# Patient Record
Sex: FEMALE | Race: BLACK OR AFRICAN AMERICAN | NOT HISPANIC OR LATINO
[De-identification: names, ages, dates, MRNs, and addresses within clinical notes are randomized per-mention and may not be internally consistent; named-entity substitution may affect disease eponyms.]

---

## 2018-06-04 PROBLEM — Z00.00 ENCOUNTER FOR PREVENTIVE HEALTH EXAMINATION: Status: ACTIVE | Noted: 2018-06-04

## 2018-11-02 ENCOUNTER — APPOINTMENT (OUTPATIENT)
Dept: GASTROENTEROLOGY | Facility: CLINIC | Age: 63
End: 2018-11-02
Payer: COMMERCIAL

## 2018-11-02 VITALS
DIASTOLIC BLOOD PRESSURE: 90 MMHG | RESPIRATION RATE: 16 BRPM | HEART RATE: 75 BPM | BODY MASS INDEX: 34.27 KG/M2 | HEIGHT: 59 IN | OXYGEN SATURATION: 98 % | SYSTOLIC BLOOD PRESSURE: 140 MMHG | WEIGHT: 170 LBS | TEMPERATURE: 98.1 F

## 2018-11-02 DIAGNOSIS — Z78.9 OTHER SPECIFIED HEALTH STATUS: ICD-10-CM

## 2018-11-02 DIAGNOSIS — R10.13 EPIGASTRIC PAIN: ICD-10-CM

## 2018-11-02 DIAGNOSIS — E66.9 OBESITY, UNSPECIFIED: ICD-10-CM

## 2018-11-02 PROCEDURE — 99244 OFF/OP CNSLTJ NEW/EST MOD 40: CPT

## 2018-11-02 RX ORDER — MAG HYDROX/ALUMINUM HYD/SIMETH 200-200-20
200-200-20 SUSPENSION, ORAL (FINAL DOSE FORM) ORAL
Refills: 0 | Status: ACTIVE | COMMUNITY

## 2018-11-02 RX ORDER — FAMOTIDINE 20 MG/1
20 TABLET, FILM COATED ORAL
Refills: 0 | Status: DISCONTINUED | COMMUNITY

## 2018-11-07 ENCOUNTER — OUTPATIENT (OUTPATIENT)
Dept: OUTPATIENT SERVICES | Facility: HOSPITAL | Age: 63
LOS: 1 days | End: 2018-11-07
Payer: COMMERCIAL

## 2018-11-07 ENCOUNTER — APPOINTMENT (OUTPATIENT)
Dept: ULTRASOUND IMAGING | Facility: CLINIC | Age: 63
End: 2018-11-07
Payer: COMMERCIAL

## 2018-11-07 DIAGNOSIS — Z00.8 ENCOUNTER FOR OTHER GENERAL EXAMINATION: ICD-10-CM

## 2018-11-07 PROCEDURE — 76700 US EXAM ABDOM COMPLETE: CPT | Mod: 26

## 2018-11-07 PROCEDURE — 76700 US EXAM ABDOM COMPLETE: CPT

## 2019-03-09 ENCOUNTER — OTHER (OUTPATIENT)
Age: 64
End: 2019-03-09

## 2019-03-09 RX ORDER — POLYETHYLENE GLYCOL 3350, SODIUM SULFATE, SODIUM CHLORIDE, POTASSIUM CHLORIDE, ASCORBIC ACID, SODIUM ASCORBATE 7.5-2.691G
100 KIT ORAL
Qty: 1 | Refills: 0 | Status: ACTIVE | COMMUNITY
Start: 2019-03-09 | End: 1900-01-01

## 2019-06-10 ENCOUNTER — APPOINTMENT (OUTPATIENT)
Dept: GASTROENTEROLOGY | Facility: HOSPITAL | Age: 64
End: 2019-06-10

## 2019-06-10 ENCOUNTER — RESULT REVIEW (OUTPATIENT)
Age: 64
End: 2019-06-10

## 2019-06-10 ENCOUNTER — OUTPATIENT (OUTPATIENT)
Dept: OUTPATIENT SERVICES | Facility: HOSPITAL | Age: 64
LOS: 1 days | Discharge: HOME | End: 2019-06-10
Payer: COMMERCIAL

## 2019-06-10 DIAGNOSIS — Z12.11 ENCOUNTER FOR SCREENING FOR MALIGNANT NEOPLASM OF COLON: ICD-10-CM

## 2019-06-10 PROCEDURE — 88305 TISSUE EXAM BY PATHOLOGIST: CPT | Mod: 26

## 2019-06-10 PROCEDURE — 45380 COLONOSCOPY AND BIOPSY: CPT

## 2019-06-11 LAB — SURGICAL PATHOLOGY STUDY: SIGNIFICANT CHANGE UP

## 2021-06-16 ENCOUNTER — APPOINTMENT (OUTPATIENT)
Dept: GASTROENTEROLOGY | Facility: CLINIC | Age: 66
End: 2021-06-16
Payer: MEDICARE

## 2021-06-16 VITALS
BODY MASS INDEX: 34.68 KG/M2 | SYSTOLIC BLOOD PRESSURE: 160 MMHG | HEIGHT: 59 IN | TEMPERATURE: 98.7 F | DIASTOLIC BLOOD PRESSURE: 80 MMHG | WEIGHT: 172 LBS

## 2021-06-16 DIAGNOSIS — R10.32 LEFT LOWER QUADRANT PAIN: ICD-10-CM

## 2021-06-16 DIAGNOSIS — K57.30 DIVERTICULOSIS OF LARGE INTESTINE W/OUT PERFORATION OR ABSCESS W/OUT BLEEDING: ICD-10-CM

## 2021-06-16 DIAGNOSIS — Z86.010 PERSONAL HISTORY OF COLONIC POLYPS: ICD-10-CM

## 2021-06-16 PROCEDURE — 99214 OFFICE O/P EST MOD 30 MIN: CPT

## 2021-06-16 NOTE — HISTORY OF PRESENT ILLNESS
[FreeTextEntry1] : Office revisit on 6/16/2021.\par \par Patient presents for follow-up regarding left lower quadrant abdominal pain.\par \par The patient was previously evaluated for office consultation on 11/2/18.\par \par INITIAL CONSULTATION NOTE:\par \par The patient is a 62-year-old woman evaluated for office consultation regarding abdominal pain. PMD: Dr. Vera Medeiros.\par \par Patient reports 2 recent episodes of severe upper abdominal pain. Was in usual state of health until approximately 10/22/18 when she experienced onset of a severe sharp upper abdominal pain radiating to the back with associated sweats, chills, nausea and nonbloody emesis. This severe episode of pain lasted 4 hours. Previously was feeling well except for the 2 days prior to onset she was experiencing back pain and migraine and was taking Excedrin.\par \par Patient was evaluated in the ER for this abdominal pain and was treated and released. Review of 10/23/18 labs:\par -CBC: WBC 8400, hemoglobin 12.7, hematocrit 37.3, platelet count 293,000.\par -CMP: Normal. Creatinine 0.64. Normal liver enzymes. ALT 20.\par -Lipase 73.\par Patient reports that an abdominal ultrasound was normal (per patient-report not available for confirmation).\par \par Famotidine was prescribed but the patient indictated poor tolerance and she discontinued it.\par \par Patient then experienced a recurrent episode of this same abdominal pain on 10/25/18 prompting a return trip to the emergency room. Similar pain recurred with complaint of an severe sharp upper abdominal pain radiating to the back with nausea.\par \par The patient continues to not feel well. However, not experiencing any further episodes of this severe sharp upper abdominal pain.\par \par Currently complaining of an intermittent complaint of upper abdominal fullness and discomfort. Feels as if she has eaten a large meal. Of note, this complaint of upper abdominal fullness is independent of food intake. Experiences onset of this in the morning and despite this full feeling she is able to eat her usual meal without having to stop because of early satiety. Experiencing some nausea but no vomiting. This upper abdominal fullness, pressure discomfort and feeling of heaviness is intermittent with episodes occurring approximately 4 times daily and lasting approximately 30 minutes. Precipitating factors not reported. As noted, no clear relationship to particular food or food intake. Does report some relief after Mylanta.\par \par Patient is also experiencing and intermittent vague retrosternal pressure discomfort.\par \par Denies fever. Appetite is fair. Reports no weight decrease. Denies any complaints of dysphagia, heartburn, regurgitation or vomiting. Experiencing intermittent nausea. Patient has 2 formed bowel movements daily without any current complains of diarrhea, constipation, change in bowel habit or rectal bleeding.\par \par Patient reports being under increased stress over this past year related to her daughter's illness.\par \par The patient indicates that one month ago she underwent an exercise stress test and nuclear cardiac testing and she reports this was normal.\par \par The patient reports a remote history of "gastritis".\par \par The patient reports that she had a normal colonoscopy 8 years ago (per patient-prior records not available for review).\par \par The patient reports no other history of digestive illness except as noted above. Family history of colon cancer is not reported.\par \par CURRENT HISTORY:\par \par COLONOSCOPY 6/10/2019:      -Screening colonoscopy was performed on 6/10/19. Total colonoscopy was performed to the cecum with ileoscopy. Normal terminal ileum. A 2 mm cecal nonneoplastic polyp and a 2 mm mid ascending colon adenoma were removed. Diverticula were noted in the ascending colon. Hemorrhoids were detected. The colonoscopy examination was otherwise normal. A followup surveillance colonoscopy is advised in 5 years. \par \par ORV 6/16/2021:     -Experiencing an intermittent left lower quadrant abdominal pain of 2 months duration.  Precipitating factors at onset not reported.  Occurred when driving in a car initially.  Reports experiencing an intermittent left lower quadrant abdominal pain described as a "sharp pain like a jab".  This is a momentary nonradiating pain lasting a few moments.  Occurs 2-3 times per week.  Precipitating factors not reported.  No clear relation to eating or particular food except possibly cashews.  No relationship to exertion, physical activity etc.  Experiences no associated symptoms such as nausea, vomiting, bowel disturbance etc.  More recently frequency of pain somewhat decreased.  Indicates being evaluated by gynecology including pelvic exam and pelvic ultrasound without significant findings.\par                                 -Otherwise feels well.  Appetite and weight are stable.  Denies any complaints of dysphagia, nausea, vomiting, bloating, abdominal distention or abdominal pain at other sites.  Has 2-3 formed bowel movements daily without any complaints of diarrhea, constipation, change in bowel habit or rectal bleeding.  Indicates no  or GYN symptoms such as urinary frequency, dysuria or vaginal discharge.

## 2021-06-16 NOTE — PHYSICAL EXAM
[General Appearance - In No Acute Distress] : in no acute distress [General Appearance - Alert] : alert [General Appearance - Well Nourished] : well nourished [General Appearance - Well Developed] : well developed [General Appearance - Well-Appearing] : healthy appearing [Oropharynx] : the oropharynx was normal [Sclera] : the sclera and conjunctiva were normal [Neck Appearance] : the appearance of the neck was normal [Respiration, Rhythm And Depth] : normal respiratory rhythm and effort [Exaggerated Use Of Accessory Muscles For Inspiration] : no accessory muscle use [Heart Rate And Rhythm] : heart rate was normal and rhythm regular [Auscultation Breath Sounds / Voice Sounds] : lungs were clear to auscultation bilaterally [Heart Sounds] : normal S1 and S2 [Heart Sounds Gallop] : no gallops [Heart Sounds Pericardial Friction Rub] : no pericardial rub [Murmurs] : no murmurs [Edema] : there was no peripheral edema [Bowel Sounds] : normal bowel sounds [Abdomen Soft] : soft [Abdomen Tenderness] : non-tender [Abdomen Mass (___ Cm)] : no abdominal mass palpated [] : no hepato-splenomegaly [Abdomen Hernia] : no hernia was discovered [Normal Sphincter Tone] : normal sphincter tone [No Rectal Mass] : no rectal mass [Cervical Lymph Nodes Enlarged Posterior Bilaterally] : posterior cervical [Cervical Lymph Nodes Enlarged Anterior Bilaterally] : anterior cervical [Supraclavicular Lymph Nodes Enlarged Bilaterally] : supraclavicular [No CVA Tenderness] : no ~M costovertebral angle tenderness [Abnormal Walk] : normal gait [Nail Clubbing] : no clubbing  or cyanosis of the fingernails [Skin Color & Pigmentation] : normal skin color and pigmentation [Oriented To Time, Place, And Person] : oriented to person, place, and time [Impaired Insight] : insight and judgment were intact [Affect] : the affect was normal [Mood] : the mood was normal [FreeTextEntry1] : No perianal tenderness.  No mass.  Good sphincter tone.  Blood not detected.

## 2021-06-16 NOTE — REASON FOR VISIT
[Follow-Up: _____] : a [unfilled] follow-up visit [FreeTextEntry1] : for evaluation of left lower quadrant abdominal pain.

## 2021-06-16 NOTE — CONSULT LETTER
[Dear  ___] : Dear  [unfilled], [Consult Letter:] : I had the pleasure of evaluating your patient, [unfilled]. [( Thank you for referring [unfilled] for consultation for _____ )] : Thank you for referring [unfilled] for consultation for [unfilled] [Please see my note below.] : Please see my note below. [Consult Closing:] : Thank you very much for allowing me to participate in the care of this patient.  If you have any questions, please do not hesitate to contact me. [Sincerely,] : Sincerely, [FreeTextEntry2] : Dr. Vera Medeiros [FreeTextEntry3] : Juan Carlos Perry M.D.

## 2021-06-16 NOTE — ASSESSMENT
[FreeTextEntry1] : Impression:\par \par #1 left lower quadrant abdominal pain–experiencing an intermittent nonradiating momentary sharp left lower quadrant abdominal pain without associated symptoms.  Reports negative gynecological evaluation.  Indicates symptoms somewhat recently improved.  Etiology unclear–possible abdominal wall etiology.  No indication of acute intra-abdominal pathologic process such as colitis, bowel obstruction and symptoms not suggestive of diverticulitis.\par \par #2  History colonic adenomatous polyp–colonoscopy in 6/10/2019 noted for removal of a diminutive 2 mm mid ascending colon adenoma.\par \par #3 ascending colon diverticulosis.\par \par #4 obesity-BMI 34.74.

## 2023-07-10 ENCOUNTER — APPOINTMENT (OUTPATIENT)
Dept: GASTROENTEROLOGY | Facility: CLINIC | Age: 68
End: 2023-07-10
Payer: MEDICARE

## 2023-07-10 VITALS
SYSTOLIC BLOOD PRESSURE: 128 MMHG | TEMPERATURE: 97.3 F | DIASTOLIC BLOOD PRESSURE: 75 MMHG | HEART RATE: 112 BPM | BODY MASS INDEX: 35.35 KG/M2 | OXYGEN SATURATION: 98 % | WEIGHT: 175 LBS

## 2023-07-10 DIAGNOSIS — Z12.11 ENCOUNTER FOR SCREENING FOR MALIGNANT NEOPLASM OF COLON: ICD-10-CM

## 2023-07-10 PROCEDURE — 36415 COLL VENOUS BLD VENIPUNCTURE: CPT

## 2023-07-10 PROCEDURE — 99214 OFFICE O/P EST MOD 30 MIN: CPT | Mod: 25

## 2023-07-10 NOTE — CONSULT LETTER
[Dear  ___] : Dear  [unfilled], [Consult Letter:] : I had the pleasure of evaluating your patient, [unfilled]. [Please see my note below.] : Please see my note below. [Consult Closing:] : Thank you very much for allowing me to participate in the care of this patient.  If you have any questions, please do not hesitate to contact me. [Sincerely,] : Sincerely, [FreeTextEntry2] : Dr. Vera Medeiros [FreeTextEntry3] : Juan Carlos Perry M.D.

## 2023-07-10 NOTE — PHYSICAL EXAM
[Alert] : alert [Normal Voice/Communication] : normal voice/communication [No Acute Distress] : no acute distress [Obese (BMI >= 30)] : obese (BMI >= 30) [Sclera] : the sclera and conjunctiva were normal [Normal Appearance] : the appearance of the neck was normal [No Neck Mass] : no neck mass was observed [No Respiratory Distress] : no respiratory distress [No Acc Muscle Use] : no accessory muscle use [Respiration, Rhythm And Depth] : normal respiratory rhythm and effort [Auscultation Breath Sounds / Voice Sounds] : lungs were clear to auscultation bilaterally [Heart Rate And Rhythm] : heart rate was normal and rhythm regular [Normal S1, S2] : normal S1 and S2 [Murmurs] : no murmurs [None] : no edema [Bowel Sounds] : normal bowel sounds [Abdomen Tenderness] : non-tender [No Masses] : no abdominal mass palpated [Abdomen Soft] : soft [] : no hepatosplenomegaly [Cervical Lymph Nodes Enlarged Posterior Bilaterally] : no posterior cervical lymphadenopathy [Cervical Lymph Nodes Enlarged Anterior Bilaterally] : no anterior cervical lymphadenopathy [Supraclavicular Lymph Nodes Enlarged Bilaterally] : no supraclavicular lymphadenopathy [No CVA Tenderness] : no CVA  tenderness [Abnormal Walk] : normal gait [Normal Color / Pigmentation] : normal skin color and pigmentation [No Clubbing, Cyanosis] : no clubbing or cyanosis of the fingernails [Oriented To Time, Place, And Person] : oriented to person, place, and time [Normal Affect] : the affect was normal [Normal Insight/Judgment] : insight and judgment were intact [Normal Mood] : the mood was normal

## 2023-07-10 NOTE — REASON FOR VISIT
[Follow-up] : a follow-up of an existing diagnosis [FreeTextEntry1] : for evaluation of left lower quadrant abdominal discomfort.

## 2023-07-10 NOTE — ASSESSMENT
[FreeTextEntry1] : Impression:\par \par #1 left lower quadrant discomfort–reports a 3-month history of a nonradiating left lower quadrant discomfort described as a pressure and "knot sensation" exclusively noted in the morning upon awakening and relieved after moving her bowels.  No change in bowel habit although does observe scant hematochezia which although likely outlet bleeding from hemorrhoids will prompt further evaluation to assess for any possibility of a colonic neoplastic process.\par \par #2 hematochezia–scant red blood in toilet paper only.  Likely outlet etiology from hemorrhoids.  Evaluate for any alternate etiology including any possibility of a colonic neoplastic process.\par \par #3 colonic adenomatous polyp–colonoscopy in 6/10/2019 noted for removal of a diminutive 2 mm mid ascending colon adenoma.\par \par #4 ascending colon diverticulosis.\par \par #5 obesity-BMI 35.35.

## 2023-07-10 NOTE — HISTORY OF PRESENT ILLNESS
[FreeTextEntry1] : Office revisit on 7/10/2023.\par \par Patient presents for follow-up regarding left lower quadrant abdominal discomfort.\par \par The patient was previously evaluated for office consultation on 11/2/18.\par \par INITIAL CONSULTATION NOTE:\par \par The patient is a 62-year-old woman evaluated for office consultation regarding abdominal pain. PMD: Dr. Vera Medeiros.\par \par Patient reports 2 recent episodes of severe upper abdominal pain. Was in usual state of health until approximately 10/22/18 when she experienced onset of a severe sharp upper abdominal pain radiating to the back with associated sweats, chills, nausea and nonbloody emesis. This severe episode of pain lasted 4 hours. Previously was feeling well except for the 2 days prior to onset she was experiencing back pain and migraine and was taking Excedrin.\par \par Patient was evaluated in the ER for this abdominal pain and was treated and released. Review of 10/23/18 labs:\par -CBC: WBC 8400, hemoglobin 12.7, hematocrit 37.3, platelet count 293,000.\par -CMP: Normal. Creatinine 0.64. Normal liver enzymes. ALT 20.\par -Lipase 73.\par Patient reports that an abdominal ultrasound was normal (per patient-report not available for confirmation).\par \par Famotidine was prescribed but the patient indictated poor tolerance and she discontinued it.\par \par Patient then experienced a recurrent episode of this same abdominal pain on 10/25/18 prompting a return trip to the emergency room. Similar pain recurred with complaint of an severe sharp upper abdominal pain radiating to the back with nausea.\par \par The patient continues to not feel well. However, not experiencing any further episodes of this severe sharp upper abdominal pain.\par \par Currently complaining of an intermittent complaint of upper abdominal fullness and discomfort. Feels as if she has eaten a large meal. Of note, this complaint of upper abdominal fullness is independent of food intake. Experiences onset of this in the morning and despite this full feeling she is able to eat her usual meal without having to stop because of early satiety. Experiencing some nausea but no vomiting. This upper abdominal fullness, pressure discomfort and feeling of heaviness is intermittent with episodes occurring approximately 4 times daily and lasting approximately 30 minutes. Precipitating factors not reported. As noted, no clear relationship to particular food or food intake. Does report some relief after Mylanta.\par \par Patient is also experiencing and intermittent vague retrosternal pressure discomfort.\par \par Denies fever. Appetite is fair. Reports no weight decrease. Denies any complaints of dysphagia, heartburn, regurgitation or vomiting. Experiencing intermittent nausea. Patient has 2 formed bowel movements daily without any current complains of diarrhea, constipation, change in bowel habit or rectal bleeding.\par \par Patient reports being under increased stress over this past year related to her daughter's illness.\par \par The patient indicates that one month ago she underwent an exercise stress test and nuclear cardiac testing and she reports this was normal.\par \par The patient reports a remote history of "gastritis".\par \par The patient reports that she had a normal colonoscopy 8 years ago (per patient-prior records not available for review).\par \par The patient reports no other history of digestive illness except as noted above. Family history of colon cancer is not reported.\par \par CURRENT HISTORY:\par \par COLONOSCOPY 6/10/2019:      -Screening colonoscopy was performed on 6/10/19. Total colonoscopy was performed to the cecum with ileoscopy. Normal terminal ileum. A 2 mm cecal nonneoplastic polyp and a 2 mm mid ascending colon adenoma were removed. Diverticula were noted in the ascending colon. Hemorrhoids were detected. The colonoscopy examination was otherwise normal. A followup surveillance colonoscopy is advised in 5 years. \par \par ORV 6/16/2021:     -Experiencing an intermittent left lower quadrant abdominal pain of 2 months duration.  Precipitating factors at onset not reported.  Occurred when driving in a car initially.  Reports experiencing an intermittent left lower quadrant abdominal pain described as a "sharp pain like a jab".  This is a momentary nonradiating pain lasting a few moments.  Occurs 2-3 times per week.  Precipitating factors not reported.  No clear relation to eating or particular food except possibly cashews.  No relationship to exertion, physical activity etc.  Experiences no associated symptoms such as nausea, vomiting, bowel disturbance etc.  More recently frequency of pain somewhat decreased.  Indicates being evaluated by gynecology including pelvic exam and pelvic ultrasound without significant findings.\par                                 -Otherwise feels well.  Appetite and weight are stable.  Denies any complaints of dysphagia, nausea, vomiting, bloating, abdominal distention or abdominal pain at other sites.  Has 2-3 formed bowel movements daily without any complaints of diarrhea, constipation, change in bowel habit or rectal bleeding.  Indicates no  or GYN symptoms such as urinary frequency, dysuria or vaginal discharge.\par \par ORV 7/10/2023:     -Experiencing a nonradiating left lower quadrant abdominal discomfort of 3 months duration.  Reports gradual onset and indicates no precipitating factors at onset of symptoms.  Localizes this discomfort to a pressure discomfort and feeling of a "knot sensation" at the left lower quadrant of the abdomen.  Described as discomfort and not a pain.  Typically awakens with this in the morning before eating breakfast.  Typically after 15 to 20 minutes will have a bowel movement and gets relief with resolution of this discomfort.  No recurrence until the following morning except sometimes is aware of this in the middle of the night if she has to get up to urinate.  Reports no associated symptoms.  Not described as a gas pain. Has 2-3 formed bowel movements daily without any complaints of diarrhea or constipation.  Intermittently experiences scant hematochezia described as red blood on the toilet paper only.  Never observes blood in the bowl or stool.  No associated anal pain with defecation.\par                                 -Denies fever.  Appetite is excellent and she has gained 20 pounds over the past 2 to 3 years.  Denies complaints of dysphagia, heartburn, nausea or vomiting.  Reports no abdominal pain or discomfort at any other site.  Does experience complaints of bloating and gas that she attributes to dietary triggers such as beans and cabbage.\par                                 -Denies any associated  or GYN symptoms.  No vaginal bleeding, dysuria, urgency etc.  Has appointment with gynecologist next week.\par                                  -Experiencing stress.  A cousin recently had colon cancer.  Her daughter has chronic medical issues over the past 5 years.

## 2023-07-11 LAB
ALBUMIN SERPL ELPH-MCNC: 4.3 G/DL
ALP BLD-CCNC: 82 U/L
ALT SERPL-CCNC: 11 U/L
ANION GAP SERPL CALC-SCNC: 11 MMOL/L
AST SERPL-CCNC: 20 U/L
BILIRUB SERPL-MCNC: 0.3 MG/DL
BUN SERPL-MCNC: 15 MG/DL
CALCIUM SERPL-MCNC: 10.1 MG/DL
CHLORIDE SERPL-SCNC: 103 MMOL/L
CO2 SERPL-SCNC: 27 MMOL/L
CREAT SERPL-MCNC: 0.84 MG/DL
EGFR: 77 ML/MIN/1.73M2
GLUCOSE SERPL-MCNC: 128 MG/DL
POTASSIUM SERPL-SCNC: 4.2 MMOL/L
PROT SERPL-MCNC: 7.1 G/DL
SODIUM SERPL-SCNC: 142 MMOL/L
TSH SERPL-ACNC: 1.22 UIU/ML

## 2023-07-17 RX ORDER — PEG-3350, SODIUM SULFATE, SODIUM CHLORIDE, POTASSIUM CHLORIDE, SODIUM ASCORBATE AND ASCORBIC ACID 7.5-2.691G
100 KIT ORAL
Qty: 1 | Refills: 0 | Status: ACTIVE | COMMUNITY
Start: 2023-07-10 | End: 1900-01-01

## 2023-07-20 ENCOUNTER — OUTPATIENT (OUTPATIENT)
Dept: OUTPATIENT SERVICES | Facility: HOSPITAL | Age: 68
LOS: 1 days | Discharge: HOME | End: 2023-07-20
Payer: MEDICARE

## 2023-07-20 ENCOUNTER — RESULT REVIEW (OUTPATIENT)
Age: 68
End: 2023-07-20

## 2023-07-20 ENCOUNTER — APPOINTMENT (OUTPATIENT)
Dept: GASTROENTEROLOGY | Facility: HOSPITAL | Age: 68
End: 2023-07-20

## 2023-07-20 VITALS
RESPIRATION RATE: 16 BRPM | OXYGEN SATURATION: 99 % | DIASTOLIC BLOOD PRESSURE: 59 MMHG | SYSTOLIC BLOOD PRESSURE: 104 MMHG | HEART RATE: 78 BPM | TEMPERATURE: 97 F | WEIGHT: 175.05 LBS | HEIGHT: 59 IN

## 2023-07-20 VITALS
DIASTOLIC BLOOD PRESSURE: 54 MMHG | HEART RATE: 58 BPM | SYSTOLIC BLOOD PRESSURE: 104 MMHG | RESPIRATION RATE: 12 BRPM | OXYGEN SATURATION: 96 %

## 2023-07-20 DIAGNOSIS — Z12.11 ENCOUNTER FOR SCREENING FOR MALIGNANT NEOPLASM OF COLON: ICD-10-CM

## 2023-07-20 PROCEDURE — 88305 TISSUE EXAM BY PATHOLOGIST: CPT | Mod: 26

## 2023-07-20 PROCEDURE — 45380 COLONOSCOPY AND BIOPSY: CPT

## 2023-07-20 NOTE — ASU PATIENT PROFILE, ADULT - FALL HARM RISK - UNIVERSAL INTERVENTIONS
Bed in lowest position, wheels locked, appropriate side rails in place/Call bell, personal items and telephone in reach/Instruct patient to call for assistance before getting out of bed or chair/Non-slip footwear when patient is out of bed/[place] to call system/Physically safe environment - no spills, clutter or unnecessary equipment/Purposeful Proactive Rounding/Room/bathroom lighting operational, light cord in reach

## 2023-07-25 ENCOUNTER — NON-APPOINTMENT (OUTPATIENT)
Age: 68
End: 2023-07-25

## 2023-07-25 LAB — SURGICAL PATHOLOGY STUDY: SIGNIFICANT CHANGE UP
